# Patient Record
Sex: FEMALE | NOT HISPANIC OR LATINO
[De-identification: names, ages, dates, MRNs, and addresses within clinical notes are randomized per-mention and may not be internally consistent; named-entity substitution may affect disease eponyms.]

---

## 2022-02-08 ENCOUNTER — APPOINTMENT (OUTPATIENT)
Dept: VASCULAR SURGERY | Facility: CLINIC | Age: 72
End: 2022-02-08
Payer: MEDICARE

## 2022-02-08 VITALS
HEART RATE: 88 BPM | DIASTOLIC BLOOD PRESSURE: 90 MMHG | BODY MASS INDEX: 39.61 KG/M2 | HEIGHT: 64 IN | SYSTOLIC BLOOD PRESSURE: 159 MMHG | WEIGHT: 232 LBS

## 2022-02-08 DIAGNOSIS — E78.5 HYPERLIPIDEMIA, UNSPECIFIED: ICD-10-CM

## 2022-02-08 DIAGNOSIS — I83.90 ASYMPTOMATIC VARICOSE VEINS OF UNSPECIFIED LOWER EXTREMITY: ICD-10-CM

## 2022-02-08 DIAGNOSIS — Z78.9 OTHER SPECIFIED HEALTH STATUS: ICD-10-CM

## 2022-02-08 DIAGNOSIS — E11.9 TYPE 2 DIABETES MELLITUS W/OUT COMPLICATIONS: ICD-10-CM

## 2022-02-08 DIAGNOSIS — I10 ESSENTIAL (PRIMARY) HYPERTENSION: ICD-10-CM

## 2022-02-08 PROCEDURE — 99203 OFFICE O/P NEW LOW 30 MIN: CPT

## 2022-02-08 PROCEDURE — 93970 EXTREMITY STUDY: CPT

## 2022-02-09 PROBLEM — E11.9 DIABETES: Status: ACTIVE | Noted: 2022-02-08

## 2022-02-09 PROBLEM — I83.90 ASYMPTOMATIC VARICOSE VEINS: Status: ACTIVE | Noted: 2022-02-09

## 2022-02-09 PROBLEM — Z78.9 SOCIAL ALCOHOL USE: Status: ACTIVE | Noted: 2022-02-08

## 2022-02-09 PROBLEM — I10 HYPERTENSION: Status: ACTIVE | Noted: 2022-02-08

## 2022-02-09 PROBLEM — E78.5 HYPERLIPIDEMIA: Status: ACTIVE | Noted: 2022-02-08

## 2022-02-10 NOTE — ASSESSMENT
[FreeTextEntry1] : 71 year old female with PMH of DM, HTN, HLD presenting to the office for evaluation of her leg swelling. She states she has had leg swelling for several years. About 5 years ago she went to a vein center and underwent two RFA procedures. After that procedure she states her legs felt better for about a year but the swelling and heaviness came back. \par \par Plan:\par - US reviewed, she does have some small varicose veins, no reflux. No intervention needed. Recommend weight loss, compression stockings, leg elevation and lymphedema pump. \par - FU here as needed.

## 2022-02-10 NOTE — HISTORY OF PRESENT ILLNESS
[FreeTextEntry1] : 71 year old female with PMH of DM, HTN, HLD presenting to the office for evluation of her leg swelling. She states she has had leg swelling for several years. About 5 years ago she went to a vein center and underwent two RFA procedures. After that procedure she states her legs felt better for about a year but the swelling and heaviness came back. \par \par Her daughter bought her a lymphedema pump which he has been using and seems to be helping. not wearing compression stockings. Has gained about 5-10 pounds the past few years.

## 2022-02-10 NOTE — PHYSICAL EXAM
[Respiratory Effort] : normal respiratory effort [Normal Heart Sounds] : normal heart sounds [2+] : left 2+ [Ankle Swelling (On Exam)] : present [Ankle Swelling Bilaterally] : bilaterally  [Ankle Swelling On The Right] : mild [Alert] : alert [Oriented to Person] : oriented to person [Oriented to Place] : oriented to place [Oriented to Time] : oriented to time [Calm] : calm [Varicose Veins Of Lower Extremities] : not present [] : not present [de-identified] : obese

## 2022-02-10 NOTE — PROCEDURE
[FreeTextEntry1] : Venous US done today hsowing bilateral GSV closed and small VV in bilateral lower leg.

## 2022-02-10 NOTE — END OF VISIT
[FreeTextEntry3] : I, Dr. Richard Peguero  have read and attest that all the information, medical decision making and discharge instructions within are true and accurate. I personally performed the evaluation and management (E/M) services for this new patient.  That E/M includes conducting the initial examination, assessing all conditions, and establishing the plan of care.  Today, my ACP, Elaina Cornelius PA-C, was here to observe my evaluation and management services for this patient to be followed going forward.\par   [Time Spent: ___ minutes] : I have spent [unfilled] minutes of time on the encounter.